# Patient Record
Sex: MALE
[De-identification: names, ages, dates, MRNs, and addresses within clinical notes are randomized per-mention and may not be internally consistent; named-entity substitution may affect disease eponyms.]

---

## 2017-02-27 ENCOUNTER — APPOINTMENT (OUTPATIENT)
Dept: ENDOCRINOLOGY | Facility: CLINIC | Age: 67
End: 2017-02-27

## 2017-02-27 VITALS
DIASTOLIC BLOOD PRESSURE: 77 MMHG | WEIGHT: 246 LBS | SYSTOLIC BLOOD PRESSURE: 154 MMHG | BODY MASS INDEX: 32.46 KG/M2 | HEART RATE: 75 BPM

## 2017-04-24 ENCOUNTER — MEDICATION RENEWAL (OUTPATIENT)
Age: 67
End: 2017-04-24

## 2017-04-26 ENCOUNTER — MEDICATION RENEWAL (OUTPATIENT)
Age: 67
End: 2017-04-26

## 2017-05-04 ENCOUNTER — MEDICATION RENEWAL (OUTPATIENT)
Age: 67
End: 2017-05-04

## 2017-05-09 ENCOUNTER — RX RENEWAL (OUTPATIENT)
Age: 67
End: 2017-05-09

## 2017-09-11 ENCOUNTER — APPOINTMENT (OUTPATIENT)
Dept: ENDOCRINOLOGY | Facility: CLINIC | Age: 67
End: 2017-09-11
Payer: MEDICARE

## 2017-09-11 ENCOUNTER — RX RENEWAL (OUTPATIENT)
Age: 67
End: 2017-09-11

## 2017-09-11 VITALS
DIASTOLIC BLOOD PRESSURE: 61 MMHG | BODY MASS INDEX: 32.6 KG/M2 | HEART RATE: 68 BPM | HEIGHT: 73 IN | WEIGHT: 246 LBS | SYSTOLIC BLOOD PRESSURE: 107 MMHG

## 2017-09-11 PROCEDURE — 99214 OFFICE O/P EST MOD 30 MIN: CPT

## 2017-09-19 ENCOUNTER — MEDICATION RENEWAL (OUTPATIENT)
Age: 67
End: 2017-09-19

## 2017-11-20 ENCOUNTER — MEDICATION RENEWAL (OUTPATIENT)
Age: 67
End: 2017-11-20

## 2018-01-22 ENCOUNTER — RX RENEWAL (OUTPATIENT)
Age: 68
End: 2018-01-22

## 2018-02-08 ENCOUNTER — RX RENEWAL (OUTPATIENT)
Age: 68
End: 2018-02-08

## 2018-04-23 ENCOUNTER — CLINICAL ADVICE (OUTPATIENT)
Age: 68
End: 2018-04-23

## 2018-05-25 ENCOUNTER — RX RENEWAL (OUTPATIENT)
Age: 68
End: 2018-05-25

## 2018-06-27 ENCOUNTER — RX RENEWAL (OUTPATIENT)
Age: 68
End: 2018-06-27

## 2018-10-22 ENCOUNTER — RX RENEWAL (OUTPATIENT)
Age: 68
End: 2018-10-22

## 2018-11-09 ENCOUNTER — RX RENEWAL (OUTPATIENT)
Age: 68
End: 2018-11-09

## 2018-12-18 ENCOUNTER — APPOINTMENT (OUTPATIENT)
Dept: ENDOCRINOLOGY | Facility: CLINIC | Age: 68
End: 2018-12-18
Payer: MEDICARE

## 2018-12-18 VITALS
DIASTOLIC BLOOD PRESSURE: 65 MMHG | HEIGHT: 73 IN | WEIGHT: 261 LBS | SYSTOLIC BLOOD PRESSURE: 139 MMHG | BODY MASS INDEX: 34.59 KG/M2 | HEART RATE: 60 BPM

## 2018-12-18 PROCEDURE — 99215 OFFICE O/P EST HI 40 MIN: CPT

## 2018-12-18 RX ORDER — HYDROCHLOROTHIAZIDE 12.5 MG/1
12.5 TABLET ORAL
Refills: 0 | Status: DISCONTINUED | COMMUNITY
End: 2018-12-18

## 2018-12-18 RX ORDER — LIRAGLUTIDE 6 MG/ML
18 INJECTION SUBCUTANEOUS
Qty: 1 | Refills: 5 | Status: DISCONTINUED | COMMUNITY
Start: 2017-09-12 | End: 2018-12-18

## 2018-12-18 RX ORDER — TRAZODONE HYDROCHLORIDE 50 MG/1
50 TABLET ORAL
Refills: 0 | Status: ACTIVE | COMMUNITY

## 2019-01-03 ENCOUNTER — RX RENEWAL (OUTPATIENT)
Age: 69
End: 2019-01-03

## 2019-01-28 ENCOUNTER — RX RENEWAL (OUTPATIENT)
Age: 69
End: 2019-01-28

## 2019-02-26 ENCOUNTER — RX RENEWAL (OUTPATIENT)
Age: 69
End: 2019-02-26

## 2019-03-01 ENCOUNTER — MEDICATION RENEWAL (OUTPATIENT)
Age: 69
End: 2019-03-01

## 2019-03-01 RX ORDER — LANCETS
EACH MISCELLANEOUS
Qty: 300 | Refills: 3 | Status: ACTIVE | COMMUNITY
Start: 2017-04-26 | End: 1900-01-01

## 2019-04-24 ENCOUNTER — MEDICATION RENEWAL (OUTPATIENT)
Age: 69
End: 2019-04-24

## 2019-04-24 RX ORDER — BLOOD SUGAR DIAGNOSTIC
STRIP MISCELLANEOUS
Qty: 1 | Refills: 5 | Status: ACTIVE | COMMUNITY
Start: 2017-09-19 | End: 1900-01-01

## 2019-04-29 ENCOUNTER — RX RENEWAL (OUTPATIENT)
Age: 69
End: 2019-04-29

## 2019-05-07 ENCOUNTER — MEDICATION RENEWAL (OUTPATIENT)
Age: 69
End: 2019-05-07

## 2019-07-29 ENCOUNTER — RX RENEWAL (OUTPATIENT)
Age: 69
End: 2019-07-29

## 2019-08-22 ENCOUNTER — RX RENEWAL (OUTPATIENT)
Age: 69
End: 2019-08-22

## 2019-09-30 ENCOUNTER — RX RENEWAL (OUTPATIENT)
Age: 69
End: 2019-09-30

## 2019-12-18 ENCOUNTER — APPOINTMENT (OUTPATIENT)
Dept: ENDOCRINOLOGY | Facility: CLINIC | Age: 69
End: 2019-12-18
Payer: MEDICARE

## 2019-12-18 VITALS
SYSTOLIC BLOOD PRESSURE: 138 MMHG | HEART RATE: 75 BPM | DIASTOLIC BLOOD PRESSURE: 62 MMHG | BODY MASS INDEX: 33.78 KG/M2 | WEIGHT: 256 LBS

## 2019-12-18 PROCEDURE — 99214 OFFICE O/P EST MOD 30 MIN: CPT

## 2019-12-18 RX ORDER — BUPROPION HYDROCHLORIDE 100 MG/1
100 TABLET, FILM COATED, EXTENDED RELEASE ORAL
Qty: 45 | Refills: 2 | Status: ACTIVE | COMMUNITY
Start: 2019-12-18 | End: 1900-01-01

## 2019-12-19 RX ORDER — OLMESARTAN MEDOXOMIL-HYDROCHLOROTHIAZIDE 12.5; 4 MG/1; MG/1
40-12.5 TABLET, FILM COATED ORAL
Refills: 0 | Status: DISCONTINUED | COMMUNITY
End: 2019-12-19

## 2019-12-19 RX ORDER — LISINOPRIL 20 MG/1
20 TABLET ORAL
Refills: 0 | Status: ACTIVE | COMMUNITY

## 2019-12-19 NOTE — DATA REVIEWED
[FreeTextEntry1] : 12/19: A1c 6.7%, GFR 45, urine microalbumin/cr 117\par 9/19: A1c 6.5%\par 6/19: A1c 6.4%, tot chol 189, HDL 41< , trig 174, TSH 2.77, 25D 48, urine microalbumin/cr 74\par 11/18: A1c 6.5%, tot chol 183, HDL 40, , trig 162, Cr 1.60\par 7/18:  A1c 6.1%, tot chol 135, trig 85, HDL 33, LDL 74, Cr 1.77 \par 4/18: A1c 6.1%, tot chol 135, trig 85, HDL 33, LDL 74, Cr 1.77 (7/10/18). \par 1/18: \par 7/17: A1c 7.1%,  Cr 1.40, urine microalb 55\par 5/17: A1c 7.9%, tot chol 158, HDL 43, LDL 69, trig 129, urine microalb/cr 148, Cr 1.33, TSH 2.08\par 10/16: A1c 6.7%, tot chol 188, trig 186, HDL 44, \par 7/16: A1c 7.3%, Cr 1.36, tot chol 175, trig 268, HDL 34, LDL 87, 25-D 36, TSH 4.09\par 4/16: A1c 7.0%, Cr 1.57\par 12/15: A1c 7.0%, tot chol 188, trig 146, HDL 51,

## 2019-12-19 NOTE — HISTORY OF PRESENT ILLNESS
[FreeTextEntry1] : able to adjust insulin according to his meals, knows how much Novolog he has to take for meals.\par usually taking 20 units for breakfast, 20 for dinner and 18 for bedtime snack.  He cannot sleep at night if he doesn't eat--a long standing habit.  if he doesn't eat, he wakes up around 3am, "starving", and then eats in the middle of the night.  If he does eat in the middle of the night, he doesn't eat breakfast.\par tseting 3x/day.\par Limits snack at night to under 400 calories, <20g carbs, 12g protein.\par foot has healed, he can walk, but not for long periods of time\par no chest pain or SOB\par goes to eye doctor in Feb.\par got flu vaccine\par \par Meds: Tresiba 48 units, Novolog 20-20-18 TID\par fluorouracil cream\par ketoconazole, hydrocortisone creams for tinea\par lisinopril 20mg,  bystolic 5mg\par trazodone 50mg\par supplements: melatonin, garlic, fish oil, Mg, Mvi, cinnamon\par Previous meds: Victoza (not effective), metformin (reduced GFR)\par

## 2019-12-19 NOTE — PHYSICAL EXAM
[Alert] : alert [Healthy Appearance] : healthy appearance [Normal Voice/Communication] : normal voice communication [Normal Hearing] : hearing was normal [No Proptosis] : no proptosis [No Lid Lag] : no lid lag [No Thyroid Nodules] : there were no palpable thyroid nodules [Clear to Auscultation] : lungs were clear to auscultation bilaterally [Thyroid Not Enlarged] : the thyroid was not enlarged [Regular Rhythm] : with a regular rhythm [Normal S1, S2] : normal S1 and S2 [No Edema] : there was no peripheral edema [Normal Affect] : the affect was normal [Normal Sensation on Monofilament Testing] : normal sensation on monofilament testing of lower extremities [Normal Mood] : the mood was normal [Foot Ulcers] : no foot ulcers [de-identified] : R 5th toe amputated, and partial 5th metatarsal amputated.   [Acanthosis Nigricans] : no acanthosis nigricans [de-identified] : reduced  pedal pulses [de-identified] : few dystophic nails

## 2019-12-19 NOTE — ASSESSMENT
[FreeTextEntry1] : Diabetes, CKD.  A1c below goal.  A1c has been at or below goal for past two years and he is now comfortable with adjusting insulin doses depending on meals, to control sugars.\par continue basal/bolus insulin regimen.\par Recommended not eating at night--this is probably hampering his weight loss efforts. Suggested eating later dinner, so that he doesn't feel the need to eat again at bedtime.\par Discussed that his GFR is low, but stable.  His current diabetes regimen (insulin only) is fine to take with CKD, but as GFR reduces more (30 or less), then insulin duration will be longer and he may be able to take less units (he will know this by sugars going low, with same amount of insulin that he had taken in past).\par RTO 1 year (can follow with primary doctor and see me sooner if sugars go out of control)\par \par

## 2020-01-06 ENCOUNTER — RX RENEWAL (OUTPATIENT)
Age: 70
End: 2020-01-06

## 2020-01-06 RX ORDER — INSULIN DEGLUDEC INJECTION 100 U/ML
100 INJECTION, SOLUTION SUBCUTANEOUS
Qty: 1 | Refills: 5 | Status: ACTIVE | COMMUNITY
Start: 2017-11-20 | End: 1900-01-01